# Patient Record
Sex: FEMALE | Race: WHITE | Employment: OTHER | URBAN - METROPOLITAN AREA
[De-identification: names, ages, dates, MRNs, and addresses within clinical notes are randomized per-mention and may not be internally consistent; named-entity substitution may affect disease eponyms.]

---

## 2019-07-09 ENCOUNTER — OFFICE VISIT (OUTPATIENT)
Dept: ORTHOPEDIC SURGERY | Age: 69
End: 2019-07-09
Payer: MEDICARE

## 2019-07-09 VITALS
HEIGHT: 67 IN | DIASTOLIC BLOOD PRESSURE: 105 MMHG | WEIGHT: 171 LBS | BODY MASS INDEX: 26.84 KG/M2 | SYSTOLIC BLOOD PRESSURE: 165 MMHG | HEART RATE: 74 BPM

## 2019-07-09 DIAGNOSIS — M75.101 ROTATOR CUFF SYNDROME OF RIGHT SHOULDER: Primary | ICD-10-CM

## 2019-07-09 PROCEDURE — 99214 OFFICE O/P EST MOD 30 MIN: CPT | Performed by: PHYSICIAN ASSISTANT

## 2019-07-09 PROCEDURE — 20611 DRAIN/INJ JOINT/BURSA W/US: CPT | Performed by: PHYSICIAN ASSISTANT

## 2019-07-09 RX ORDER — NORTRIPTYLINE HYDROCHLORIDE 50 MG/1
50 CAPSULE ORAL NIGHTLY
COMMUNITY
Start: 2019-01-14

## 2019-07-09 RX ORDER — NITROFURANTOIN MACROCRYSTALS 50 MG/1
50 CAPSULE ORAL NIGHTLY
COMMUNITY
Start: 2019-04-11

## 2019-07-09 RX ORDER — LIDOCAINE HYDROCHLORIDE 10 MG/ML
4 INJECTION, SOLUTION INFILTRATION; PERINEURAL ONCE
Status: SHIPPED | OUTPATIENT
Start: 2019-07-09

## 2019-07-09 RX ORDER — METHYLPREDNISOLONE ACETATE 40 MG/ML
40 INJECTION, SUSPENSION INTRA-ARTICULAR; INTRALESIONAL; INTRAMUSCULAR; SOFT TISSUE ONCE
Status: SHIPPED | OUTPATIENT
Start: 2019-07-09

## 2019-07-09 RX ORDER — CITALOPRAM 20 MG/1
20 TABLET ORAL DAILY
COMMUNITY
Start: 2019-04-11

## 2019-07-09 RX ORDER — ATORVASTATIN CALCIUM 20 MG/1
20 TABLET, FILM COATED ORAL DAILY
COMMUNITY
Start: 2019-04-11

## 2019-07-09 ASSESSMENT — ENCOUNTER SYMPTOMS
APNEA: 0
ABDOMINAL DISTENTION: 0
SHORTNESS OF BREATH: 0
DIARRHEA: 0
COLOR CHANGE: 0
CHEST TIGHTNESS: 0
NAUSEA: 0
ABDOMINAL PAIN: 0
VOMITING: 0
CONSTIPATION: 0
COUGH: 0

## 2019-07-09 NOTE — PATIENT INSTRUCTIONS
Patient Education     Rotator Cuff: Exercises  Your Care Instructions  Here are some examples of typical rehabilitation exercises for your condition. Start each exercise slowly. Ease off the exercise if you start to have pain. Your doctor or physical therapist will tell you when you can start these exercises and which ones will work best for you. How to do the exercises  Pendulum swing    1. Hold on to a table or the back of a chair with your good arm. Then bend forward a little and let your sore arm hang straight down. This exercise does not use the arm muscles. Rather, use your legs and your hips to create movement that makes your arm swing freely. 2. Use the movement from your hips and legs to guide the slightly swinging arm back and forth like a pendulum (or elephant trunk). Then guide it in circles that start small (about the size of a dinner plate). Make the circles a bit larger each day, as your pain allows. 3. Do this exercise for 5 minutes, 5 to 7 times each day. 4. As you have less pain, try bending over a little farther to do this exercise. This will increase the amount of movement at your shoulder. Posterior stretching exercise    1. Hold the elbow of your injured arm with your other hand. 2. Use your hand to pull your injured arm gently up and across your body. You will feel a gentle stretch across the back of your injured shoulder. 3. Hold for at least 15 to 30 seconds. Then slowly lower your arm. 4. Repeat 2 to 4 times. Up-the-back stretch    1. Put your hand in your back pocket. Let it rest there to stretch your shoulder. 2. With your other hand, hold your injured arm (palm outward) behind your back by the wrist. Pull your arm up gently to stretch your shoulder. 3. Next, put a towel over your other shoulder. Put the hand of your injured arm behind your back. Now hold the back end of the towel. With the other hand, hold the front end of the towel in front of your body.  Pull gently on

## 2019-08-20 ENCOUNTER — OFFICE VISIT (OUTPATIENT)
Dept: ORTHOPEDIC SURGERY | Age: 69
End: 2019-08-20
Payer: MEDICARE

## 2019-08-20 VITALS
HEART RATE: 76 BPM | BODY MASS INDEX: 26.09 KG/M2 | HEIGHT: 67 IN | DIASTOLIC BLOOD PRESSURE: 101 MMHG | WEIGHT: 166.2 LBS | SYSTOLIC BLOOD PRESSURE: 158 MMHG

## 2019-08-20 DIAGNOSIS — M75.101 ROTATOR CUFF SYNDROME OF RIGHT SHOULDER: Primary | ICD-10-CM

## 2019-08-20 PROCEDURE — 99213 OFFICE O/P EST LOW 20 MIN: CPT | Performed by: PHYSICIAN ASSISTANT

## 2019-08-20 ASSESSMENT — ENCOUNTER SYMPTOMS
ABDOMINAL PAIN: 0
CONSTIPATION: 0
COUGH: 0
COLOR CHANGE: 0
NAUSEA: 0
CHEST TIGHTNESS: 0
SHORTNESS OF BREATH: 0
APNEA: 0
ABDOMINAL DISTENTION: 0
DIARRHEA: 0
VOMITING: 0

## 2019-09-10 ENCOUNTER — HOSPITAL ENCOUNTER (OUTPATIENT)
Dept: MRI IMAGING | Age: 69
Discharge: HOME | End: 2019-09-12
Payer: MEDICARE

## 2019-09-10 DIAGNOSIS — M75.101 ROTATOR CUFF SYNDROME OF RIGHT SHOULDER: ICD-10-CM

## 2019-09-10 PROCEDURE — 73221 MRI JOINT UPR EXTREM W/O DYE: CPT

## 2019-09-12 ENCOUNTER — TELEPHONE (OUTPATIENT)
Dept: ORTHOPEDIC SURGERY | Age: 69
End: 2019-09-12

## 2023-09-15 NOTE — PROGRESS NOTES
[facility]  [facility] [address]  [place] [address]  Dept: [phone]  Dept Fax: [phone]          Right Shoulder - Established Patient - New Complaint     Chief Complaint:     Chief Complaint   Patient presents with    Shoulder Pain     Rt shoulder is sore and having difficulty raising arm above head. Started approx May. Having popping clicking. Medrol dose pack and Tylenol arthritis was perscribed by PCP at the beginning of June with no relief. HPI:     Mickie Dooley is a 80y.o. year old right hand dominant female that has had pain in the right shoulder for 2 months since May 2019. As far as any trauma to the shoulder, the patient indicates there was no traumatic injury. The pain is worse at night when trying to lay on the side and when doing overhead activities. Weakness of the shoulder has been noted. The pain restricts activities such as lifting above the head, lifting heavy objects, throwing, weeding, lifting purse and sleeping comfortably. The pain does not seem to improve with time. The following medications have been tried: medrol dose pack from her PCP and tylenol arthritis. The patient has not had a corticosteroid injection. The patient has not tried physical therapy. The patient has not had surgery. The opposite shoulder is okay. Neck pain has not been present. [place] something off of the sink an she had an instant sharp pain causing her to pull the arm back. She also stated that she weeded and laid mulch down in May when she was at home and she had achy pain when she was done. Review of Systems   Constitutional: Positive for activity change. Negative for appetite change, fatigue and fever. Respiratory: Negative for apnea, cough, chest tightness and shortness of breath. Cardiovascular: Negative for chest pain, palpitations and leg swelling. Pt called requesting a refill of hydrocodone to be filled at [facility].   30 degrees of external rotation in neutral, 80 degrees of external rotation in abduction, internal rotation to L2.    STRENGTH: Supraspinatus 4/5, external rotators 4+/5. MUSC: No atrophy, (+) subscap lift off, (-) belly press test.  IMP:  (+) Neer's sign, (+) Hawkin's sign, No painful arc, no pain with cross body abduction. PALP: no pain over anterolateral acromion, Bicep tendon pain, no pain over AC joint, no pain over traps/rhomboids. INST: (-) [place]'s test, (+) Speed's test.    Assessment:     1. Rotator cuff syndrome of right shoulder      Procedures:    Procedure: yes    Subacromial Bursa Injection    Location: Right Shoulder  Procedure: Corticosteroid injection into the right shoulder. The patient was placed in the sitting position on the exam table. The posterior soft spot approximately 2 cm distal and 2 cm medial to the posterior acromial edge was identified and marked. The skin was prepped with betadine in a sterile fashion. Utilizing clean technique with sterile gloves and ultrasound for precise placement, a 5 cc solution containing 4 cc of 1% Lidocaine with 1 cc containing 40mg of depo medrol was injected into the subacromial space. There was no resistance to injection. The wound was cleansed and a Band-Aid was placed. The patient tolerated the procedure without difficulty. Adverse reactions of the injection was discussed with the patient including signs of infection (increasing pain, redness, swelling) and the patient was instructed to call immediately with any of these symptoms. Radiology:   See separate report. Plan:   Treatment : I reviewed the X-ray with the patient and I informed them that the shoulder may have some wear and tear from use throughout her lifetime and she may have a degenerative tear of the rotator cuff and some inflammation of the bursa which may be causing her pain. We discussed the etiologies and natural histories of Rotator cuff syndrome in the right shoulder.